# Patient Record
(demographics unavailable — no encounter records)

---

## 2025-03-22 NOTE — DISCUSSION/SUMMARY
[FreeTextEntry1] : 7 y/o female here with likely viral acute uri  Well appearing, well hydrated, afebrile in office. Clear lungs upon auscultation. Unremarkable oropharynx and otic exam.  Supportive care: Nasal saline, suction, humidifier as needed. Drink plenty of fluids. Fever reducing medications as needed. OTC cough suppressants not recommended in children under the age of 7. Can try Zarbee's or Liz's herbal cough soothing syrup. Avoid those that have honey if under 1 year of age. Seek immediate attention for difficulty breathing, dehydration or prolonged fever (more than 5 days)/elevated fever (greater than 105). Check back if symptoms are prolonged, worsening or if new concerns arise. rapid flu and cov neg f/u tc   Parent/Guardian agree with and express understanding of plan.

## 2025-03-22 NOTE — HISTORY OF PRESENT ILLNESS
[EENT/Resp Symptoms] : EENT/RESPIRATORY SYMPTOMS [Runny nose] : runny nose [Nasal congestion] : nasal congestion [___ Day(s)] : [unfilled] day(s) [Intermittent] : intermittent [Known Exposure to COVID-19] : no known exposure to COVID-19 [Hx of recent COVID-19 infection] : no history of recent COVID-19 infection [Sick Contacts: ___] : no sick contacts [Clear rhinorrhea] : clear rhinorrhea [Wet cough] : wet cough [At Night] : at night [Acetaminophen] : acetaminophen [Ibuprofen] : ibuprofen [Fever] : fever [Headache] : no headache [Change in sleep] : no change in sleep  [Eye Redness] : no eye redness [Eye Discharge] : no eye discharge [Eye Itching] : no eye itching [Ear Pain] : no ear pain [Rhinorrhea] : rhinorrhea [Nasal Congestion] : nasal congestion [Sore Throat] : sore throat [Palpitations] : no palpitations [Chest Pain] : no chest pain [Cough] : cough [Wheezing] : no wheezing [Shortness of Breath] : no shortness of breath [Tachypnea] : no tachypnea [Decreased Appetite] : no decreased appetite [Posttussive emesis] : no posttussive emesis [Vomiting] : no vomiting [Diarrhea] : no diarrhea [Decreased Urine Output] : no decreased urine output [Rash] : no rash [Max Temp: ____] : Max temperature: [unfilled]

## 2025-03-22 NOTE — PHYSICAL EXAM
[Acute Distress] : no acute distress [Alert] : alert [EOMI] : grossly EOMI [Conjuctival Injection] : no conjunctival injection [Clear] : right tympanic membrane clear [Pink Nasal Mucosa] : pink nasal mucosa [Clear Rhinorrhea] : clear rhinorrhea [Erythematous Oropharynx] : nonerythematous oropharynx [Enlarged Tonsils] : tonsils not enlarged [Supple] : supple [Clear to Auscultation Bilaterally] : clear to auscultation bilaterally [Wheezing] : no wheezing [Tachypnea] : no tachypnea [Regular Rate and Rhythm] : regular rate and rhythm [Soft] : soft [No Abnormal Lymph Nodes Palpated] : no abnormal lymph nodes palpated [Moves All Extremities x 4] : moves all extremities x4 [Warm, Well Perfused x4] : warm, well perfused x4 [Capillary Refill <2s] : capillary refill < 2s

## 2025-05-28 NOTE — DISCUSSION/SUMMARY
[FreeTextEntry1] :  8 yr old female with sharp sternal pain after eating, likely reflux. Recommend no acidic foods, no fried foods. Recommend Children's pepto bismal prn. If no improvement start famotidine q daily. All questions answered. Caretaker understands and agrees with plan.

## 2025-05-28 NOTE — HISTORY OF PRESENT ILLNESS
[EENT/Resp Symptoms] : EENT/RESPIRATORY SYMPTOMS [Chest Pain] : chest pain [___ Month(s)] : [unfilled] month(s) [Intermittent] : intermittent [Fever] : no fever [Cough] : no cough [Shortness of Breath] : no shortness of breath [Tachypnea] : no tachypnea [FreeTextEntry8] : after eating [FreeTextEntry4] : resting [FreeTextEntry5] : no radiation to arm or back. sometimes sour taste in mouth [de-identified] : Pt has ketchup 2x/day and lemon water